# Patient Record
Sex: MALE | Race: WHITE | NOT HISPANIC OR LATINO | Employment: FULL TIME | URBAN - METROPOLITAN AREA
[De-identification: names, ages, dates, MRNs, and addresses within clinical notes are randomized per-mention and may not be internally consistent; named-entity substitution may affect disease eponyms.]

---

## 2021-08-26 ENCOUNTER — HOSPITAL ENCOUNTER (EMERGENCY)
Facility: HOSPITAL | Age: 56
Discharge: HOME | End: 2021-08-27
Attending: EMERGENCY MEDICINE
Payer: COMMERCIAL

## 2021-08-26 VITALS
HEART RATE: 54 BPM | HEIGHT: 64 IN | SYSTOLIC BLOOD PRESSURE: 179 MMHG | WEIGHT: 155 LBS | OXYGEN SATURATION: 99 % | TEMPERATURE: 97.7 F | DIASTOLIC BLOOD PRESSURE: 106 MMHG | BODY MASS INDEX: 26.46 KG/M2 | RESPIRATION RATE: 20 BRPM

## 2021-08-26 DIAGNOSIS — R79.1 SUPRATHERAPEUTIC INR: ICD-10-CM

## 2021-08-26 DIAGNOSIS — S01.81XA FACIAL LACERATION, INITIAL ENCOUNTER: ICD-10-CM

## 2021-08-26 DIAGNOSIS — S09.93XA FACIAL INJURY, INITIAL ENCOUNTER: Primary | ICD-10-CM

## 2021-08-26 PROCEDURE — 99284 EMERGENCY DEPT VISIT MOD MDM: CPT

## 2021-08-26 PROCEDURE — 99284 EMERGENCY DEPT VISIT MOD MDM: CPT | Performed by: EMERGENCY MEDICINE

## 2021-08-26 PROCEDURE — 90471 IMMUNIZATION ADMIN: CPT

## 2021-08-26 RX ORDER — TRANEXAMIC ACID 100 MG/ML
500 INJECTION, SOLUTION INTRAVENOUS ONCE
Status: COMPLETED | OUTPATIENT
Start: 2021-08-27 | End: 2021-08-27

## 2021-08-27 ENCOUNTER — APPOINTMENT (EMERGENCY)
Dept: CT IMAGING | Facility: HOSPITAL | Age: 56
End: 2021-08-27
Payer: COMMERCIAL

## 2021-08-27 LAB
BASOPHILS # BLD MANUAL: 0.08 THOUSAND/UL (ref 0–0.1)
BASOPHILS NFR MAR MANUAL: 1 % (ref 0–1)
EOSINOPHIL # BLD MANUAL: 0.08 THOUSAND/UL (ref 0–0.4)
EOSINOPHIL NFR BLD MANUAL: 1 % (ref 0–6)
ERYTHROCYTE [DISTWIDTH] IN BLOOD BY AUTOMATED COUNT: 13.8 % (ref 11.6–15.1)
HCT VFR BLD AUTO: 41.2 % (ref 36.5–49.3)
HGB BLD-MCNC: 13.4 G/DL (ref 12–17)
INR PPP: 5.39 (ref 0.84–1.19)
LYMPHOCYTES # BLD AUTO: 2.62 THOUSAND/UL (ref 0.6–4.47)
LYMPHOCYTES # BLD AUTO: 34 % (ref 14–44)
MCH RBC QN AUTO: 29.7 PG (ref 26.8–34.3)
MCHC RBC AUTO-ENTMCNC: 32.5 G/DL (ref 31.4–37.4)
MCV RBC AUTO: 91 FL (ref 82–98)
MONOCYTES # BLD AUTO: 0.77 THOUSAND/UL (ref 0–1.22)
MONOCYTES NFR BLD: 10 % (ref 4–12)
NEUTROPHILS # BLD MANUAL: 3.78 THOUSAND/UL (ref 1.85–7.62)
NEUTS BAND NFR BLD MANUAL: 1 % (ref 0–8)
NEUTS SEG NFR BLD AUTO: 48 % (ref 43–75)
PLATELET # BLD AUTO: 335 THOUSANDS/UL (ref 149–390)
PLATELET BLD QL SMEAR: ADEQUATE
PMV BLD AUTO: 10 FL (ref 8.9–12.7)
PROTHROMBIN TIME: 49.5 SECONDS (ref 11.6–14.5)
RBC # BLD AUTO: 4.51 MILLION/UL (ref 3.88–5.62)
RBC MORPH BLD: NORMAL
VARIANT LYMPHS # BLD AUTO: 5 %
WBC # BLD AUTO: 7.71 THOUSAND/UL (ref 4.31–10.16)

## 2021-08-27 PROCEDURE — 85027 COMPLETE CBC AUTOMATED: CPT | Performed by: EMERGENCY MEDICINE

## 2021-08-27 PROCEDURE — 36415 COLL VENOUS BLD VENIPUNCTURE: CPT | Performed by: EMERGENCY MEDICINE

## 2021-08-27 PROCEDURE — 70450 CT HEAD/BRAIN W/O DYE: CPT

## 2021-08-27 PROCEDURE — 90715 TDAP VACCINE 7 YRS/> IM: CPT | Performed by: EMERGENCY MEDICINE

## 2021-08-27 PROCEDURE — 70486 CT MAXILLOFACIAL W/O DYE: CPT

## 2021-08-27 PROCEDURE — 85007 BL SMEAR W/DIFF WBC COUNT: CPT | Performed by: EMERGENCY MEDICINE

## 2021-08-27 PROCEDURE — 85610 PROTHROMBIN TIME: CPT | Performed by: EMERGENCY MEDICINE

## 2021-08-27 PROCEDURE — 96365 THER/PROPH/DIAG IV INF INIT: CPT

## 2021-08-27 RX ADMIN — PHYTONADIONE 5 MG: 10 INJECTION, EMULSION INTRAMUSCULAR; INTRAVENOUS; SUBCUTANEOUS at 01:55

## 2021-08-27 RX ADMIN — TETANUS TOXOID, REDUCED DIPHTHERIA TOXOID AND ACELLULAR PERTUSSIS VACCINE, ADSORBED 0.5 ML: 5; 2.5; 8; 8; 2.5 SUSPENSION INTRAMUSCULAR at 00:38

## 2021-08-27 RX ADMIN — TRANEXAMIC ACID 500 MG: 1 INJECTION, SOLUTION INTRAVENOUS at 00:20

## 2021-08-27 NOTE — ED PROVIDER NOTES
History  Chief Complaint   Patient presents with    Facial Injury     Pt presents to ED with facial injury  Pt reports injury occured last night and was seen at urgent care today  Pt reports a 2x4 hit him on his right face and pt is on coumadin  Pt reports INR is elevated on home meter and read 6      49-year-old male presents emergency department with facial injury  Yesterday he got hit in the face with a 2 x 4  This was yesterday evening  Patient thought that it would improve at home however he is on Coumadin and is home INR machine read that his INR is 8  He has not been able to get the bleeding under control  He went to urgent care they were unable to get the bleeding under control so they sent to the emergency department  Patient denies headache, no loss of consciousness, no other injuries from the event  Unknown last tetanus shot  Patient is bleeding through bandages placed at urgent care  Topical TXA and surgical foam is used and apparently sufficient to control bleeding  None       Past Medical History:   Diagnosis Date    Hypertension        Past Surgical History:   Procedure Laterality Date    VALVE REPLACEMENT         History reviewed  No pertinent family history  I have reviewed and agree with the history as documented  E-Cigarette/Vaping     E-Cigarette/Vaping Substances     Social History     Tobacco Use    Smoking status: Never Smoker    Smokeless tobacco: Never Used   Substance Use Topics    Alcohol use: Not on file    Drug use: Not on file       Review of Systems   Constitutional: Negative for chills, fatigue and fever  HENT: Negative for congestion, rhinorrhea and sore throat  Respiratory: Negative for shortness of breath  Cardiovascular: Negative for chest pain  Gastrointestinal: Negative for abdominal pain, nausea and vomiting  Musculoskeletal: Negative for back pain, neck pain and neck stiffness  Skin: Positive for wound (Right facial)  Neurological: Negative for dizziness, weakness, light-headedness, numbness and headaches  Hematological: Bruises/bleeds easily  All other systems reviewed and are negative  Physical Exam  Physical Exam  Vitals reviewed  Constitutional:       General: He is not in acute distress  Appearance: He is well-developed  He is not ill-appearing, toxic-appearing or diaphoretic  HENT:      Head: Normocephalic  Right Ear: Tympanic membrane normal       Left Ear: Tympanic membrane normal       Nose:      Comments: Wound to left nose, bleeding is controlled  Eyes:      Conjunctiva/sclera: Conjunctivae normal    Cardiovascular:      Rate and Rhythm: Regular rhythm  Bradycardia present  Pulmonary:      Effort: Pulmonary effort is normal  No respiratory distress  Breath sounds: Normal breath sounds  Musculoskeletal:         General: Normal range of motion  Cervical back: Normal range of motion and neck supple  No rigidity or tenderness  Lymphadenopathy:      Cervical: No cervical adenopathy  Skin:     General: Skin is warm and dry  Coloration: Skin is not pale  Comments: Bleeding wound to right face, difficult to control because of low clotting factor  No embedded FB  No suture repair needed   Neurological:      Mental Status: He is alert and oriented to person, place, and time  Cranial Nerves: No cranial nerve deficit     Psychiatric:         Behavior: Behavior normal          Vital Signs  ED Triage Vitals [08/26/21 2250]   Temperature Pulse Respirations Blood Pressure SpO2   97 7 °F (36 5 °C) (!) 54 20 (!) 179/106 99 %      Temp Source Heart Rate Source Patient Position - Orthostatic VS BP Location FiO2 (%)   Temporal Monitor Sitting Left arm --      Pain Score       2           Vitals:    08/26/21 2250   BP: (!) 179/106   Pulse: (!) 54   Patient Position - Orthostatic VS: Sitting         Visual Acuity      ED Medications  Medications   tranexamic acid 100mg/mL (for epistaxis) 500 mg (500 mg Nasal Given 8/27/21 0020)   tetanus-diphtheria-acellular pertussis (BOOSTRIX) IM injection 0 5 mL (0 5 mL Intramuscular Given 8/27/21 0038)   phytonadione (AQUA-MEPHYTON) 10 mg/mL 5 mg in sodium chloride 0 9 % 50 mL IVPB (0 mg Intravenous Stopped 8/27/21 0300)       Diagnostic Studies  Results Reviewed     Procedure Component Value Units Date/Time    Protime-INR [[id number]]  (Abnormal) Collected: 08/27/21 0021    Lab Status: Final result Specimen: Blood from Arm, Right Updated: 08/27/21 0106     Protime 49 5 seconds      INR 5 39    CBC and differential [[id number]]  (Normal) Collected: 08/27/21 0021    Lab Status: Final result Specimen: Blood from Arm, Right Updated: 08/27/21 0057     WBC 7 71 Thousand/uL      RBC 4 51 Million/uL      Hemoglobin 13 4 g/dL      Hematocrit 41 2 %      MCV 91 fL      MCH 29 7 pg      MCHC 32 5 g/dL      RDW 13 8 %      MPV 10 0 fL      Platelets 423 Thousands/uL     Manual Differential(PHLEBS Do Not Order) [[id number]]  (Abnormal) Collected: 08/27/21 0021    Lab Status: Final result Specimen: Blood from Arm, Right Updated: 08/27/21 0057     Segmented % 48 %      Bands % 1 %      Lymphocytes % 34 %      Monocytes % 10 %      Eosinophils, % 1 %      Basophils % 1 %      Atypical Lymphocytes % 5 %      Absolute Neutrophils 3 78 Thousand/uL      Lymphocytes Absolute 2 62 Thousand/uL      Monocytes Absolute 0 77 Thousand/uL      Eosinophils Absolute 0 08 Thousand/uL      Basophils Absolute 0 08 Thousand/uL      Total Counted --     RBC Morphology Normal     Platelet Estimate Adequate                 CT head without contrast   Final Result by Doris Cuellar MD (08/27 0149)      No intracranial hemorrhage or calvarial fracture  Workstation performed: [id number]         CT facial bones without contrast   Final Result by Doris Cuellar MD (08/27 2434)      No acute maxillofacial fracture                 Workstation performed: [id number] Procedures  Procedures         ED Course  ED Course as of Aug 28 0731   Fri Aug 27, 2021   0104 No blood loss anemia   Hemoglobin: 13 4   0120 Patient agreeable to plan, will give IV vitamin K, reassess facial wound discharge home  SBIRT 20yo+      Most Recent Value   SBIRT (22 yo +)   In order to provide better care to our patients, we are screening all of our patients for alcohol and drug use  Would it be okay to ask you these screening questions? Yes Filed at: 08/27/2021 0156   Initial Alcohol Screen: US AUDIT-C    1  How often do you have a drink containing alcohol?  0 Filed at: 08/27/2021 0156   2  How many drinks containing alcohol do you have on a typical day you are drinking? 0 Filed at: 08/27/2021 0156   3a  Male UNDER 65: How often do you have five or more drinks on one occasion? 0 Filed at: 08/27/2021 0156   Audit-C Score  0 Filed at: 08/27/2021 0715   OZZIE: How many times in the past year have you    Used an illegal drug or used a prescription medication for non-medical reasons? Never Filed at: 08/27/2021 0156                    MDM  Number of Diagnoses or Management Options  Facial injury, initial encounter  Facial laceration, initial encounter  Supratherapeutic INR  Diagnosis management comments: Facial injury, bleeding to right face that is difficult to control  CT of head and face to rule out intracranial hemorrhage, rule out facial bone fracture  Imaging is negative for intracranial injury or facial bone fracture  Patient's INR is supratherapeutic 5 39  He is given IV vitamin K, Surgicel foam used to control bleeding, patient discharged in stable condition and is advised recheck INR in the morning and to discuss with primary care provider Coumadin dosing going forward  Advised return for rebleeding for signs of intracranial injury  Discharged in stable condition         Amount and/or Complexity of Data Reviewed  Clinical lab tests: ordered and reviewed  Tests in the radiology section of CPT®: ordered and reviewed        Disposition  Final diagnoses:   Facial injury, initial encounter   Facial laceration, initial encounter   Supratherapeutic INR     Time reflects when diagnosis was documented in both MDM as applicable and the Disposition within this note     Time User Action Codes Description Comment    8/27/2021 12:49 AM Aashish Viera Add [S09 93XA] Facial injury, initial encounter     8/27/2021  1:46 AM Tanya Viera Add [S01 81XA] Facial laceration, initial encounter     8/27/2021  1:46 AM Aashish Viera Add [R79 1] Supratherapeutic INR       ED Disposition     ED Disposition Condition Date/Time Comment    Discharge Stable Fri Aug 27, 2021  1:46 AM Aliyah Villavicencio discharge to home/self care  Follow-up Information     Follow up With Specialties Details Why Contact Info Additional Information    Baldev Cruz MD Sleep Medicine, Family Medicine Schedule an appointment as soon as possible for a visit  For follow up to ensure improvement, and for further testing and treatment as needed [address] [place] [address] Emergency Department Emergency Medicine  If symptoms worsen: rebleeding, signs of worsening head injury, signs of infection, worsening INR, etc [address] [facility] [id number] [phone] [facility] Emergency Department, [address] [place] [address]          There are no discharge medications for this patient  No discharge procedures on file      PDMP Review     None          ED Provider  Electronically Signed by           hSelia Reyes DO  08/28/21 6920

## 2024-09-24 ENCOUNTER — TELEPHONE (OUTPATIENT)
Age: 59
End: 2024-09-24

## 2025-01-10 ENCOUNTER — PREP FOR PROCEDURE (OUTPATIENT)
Age: 60
End: 2025-01-10

## 2025-01-17 ENCOUNTER — OFFICE VISIT (OUTPATIENT)
Age: 60
End: 2025-01-17
Payer: COMMERCIAL

## 2025-01-17 VITALS
HEART RATE: 52 BPM | SYSTOLIC BLOOD PRESSURE: 127 MMHG | OXYGEN SATURATION: 98 % | DIASTOLIC BLOOD PRESSURE: 78 MMHG | WEIGHT: 165 LBS | BODY MASS INDEX: 28.32 KG/M2

## 2025-01-17 DIAGNOSIS — Z12.11 SCREENING FOR COLON CANCER: Primary | ICD-10-CM

## 2025-01-17 DIAGNOSIS — Z95.2 HISTORY OF AORTIC VALVE REPLACEMENT: ICD-10-CM

## 2025-01-17 PROCEDURE — 99203 OFFICE O/P NEW LOW 30 MIN: CPT | Performed by: COLON & RECTAL SURGERY

## 2025-01-17 RX ORDER — ATENOLOL 50 MG/1
1 TABLET ORAL DAILY
COMMUNITY
Start: 2024-12-05

## 2025-01-17 RX ORDER — ASPIRIN 81 MG/1
81 TABLET, CHEWABLE ORAL DAILY
COMMUNITY

## 2025-01-17 RX ORDER — SODIUM CHLORIDE, SODIUM LACTATE, POTASSIUM CHLORIDE, CALCIUM CHLORIDE 600; 310; 30; 20 MG/100ML; MG/100ML; MG/100ML; MG/100ML
125 INJECTION, SOLUTION INTRAVENOUS CONTINUOUS
OUTPATIENT
Start: 2025-01-17

## 2025-01-17 RX ORDER — WARFARIN SODIUM 2.5 MG/1
1 TABLET ORAL DAILY
COMMUNITY
Start: 2024-10-12

## 2025-01-17 NOTE — PATIENT INSTRUCTIONS
Scheduled date of colonoscopy (as of today): 3/15/25  Physician performing colonoscopy: Wilda  Location of colonoscopy: [place]  Bowel prep reviewed with patient: Golytely  Instructions reviewed with patient by: Sindhu GUSMAN  Clearances: Coumadin- 1 day hold

## 2025-01-17 NOTE — PROGRESS NOTES
Name: Paulo Mackenzie      : 1965      MRN: [id number]  Encounter Provider: Pancho Astudillo MD  Encounter Date: 2025   Encounter department: [facility] COLON AND RECTAL SURGERY [place]  :  Assessment & Plan  Screening for colon cancer    Orders:    Colonoscopy; Future    History of aortic valve replacement  Patient will stop his Coumadin 1 day prior to colonoscopy.    Patient will stop his aspirin 5 days prior to colonoscopy           History of Present Illness   HPI  Paulo Mackenzie is a 59 y.o. male who presents age indicated screening colonoscopy last colonoscopy   History obtained from: patient    Review of Systems   Constitutional:  Negative for chills and fever.   HENT:  Negative for ear pain and sore throat.    Eyes:  Negative for pain and visual disturbance.   Respiratory:  Negative for cough and shortness of breath.    Cardiovascular:  Negative for chest pain and palpitations.   Gastrointestinal:  Negative for abdominal pain and vomiting.   Genitourinary:  Negative for dysuria and hematuria.   Musculoskeletal:  Negative for arthralgias and back pain.   Skin:  Negative for color change and rash.   Neurological:  Negative for seizures and syncope.   All other systems reviewed and are negative.    Past Medical History   Past Medical History:   Diagnosis Date    Hyperlipidemia     Hypertension      Past Surgical History:   Procedure Laterality Date    VALVE REPLACEMENT       History reviewed. No pertinent family history.   reports that he has never smoked. He has never used smokeless tobacco.  Current Outpatient Medications on File Prior to Visit   Medication Sig Dispense Refill    aspirin 81 mg chewable tablet Chew 81 mg daily      atenolol (TENORMIN) 50 mg tablet Take 1 tablet by mouth in the morning      Rosuvastatin Calcium POWD Take by mouth      warfarin (COUMADIN) 2.5 mg tablet Take 1 tablet by mouth in the morning       No current facility-administered medications on file prior  to visit.   No Known Allergies      Objective   /78   Pulse (!) 52   Wt 74.8 kg (165 lb)   SpO2 98%   BMI 28.32 kg/m²      Physical Exam  Vitals and nursing note reviewed.   Constitutional:       General: He is not in acute distress.     Appearance: He is well-developed.   HENT:      Head: Normocephalic and atraumatic.   Eyes:      Conjunctiva/sclera: Conjunctivae normal.   Cardiovascular:      Rate and Rhythm: Normal rate and regular rhythm.      Heart sounds: No murmur heard.  Pulmonary:      Effort: Pulmonary effort is normal. No respiratory distress.      Breath sounds: Normal breath sounds.   Abdominal:      Palpations: Abdomen is soft.      Tenderness: There is no abdominal tenderness.   Musculoskeletal:         General: No swelling.      Cervical back: Neck supple.   Skin:     General: Skin is warm and dry.      Capillary Refill: Capillary refill takes less than 2 seconds.   Neurological:      Mental Status: He is alert.   Psychiatric:         Mood and Affect: Mood normal.

## 2025-03-15 ENCOUNTER — ANESTHESIA (OUTPATIENT)
Dept: GASTROENTEROLOGY | Facility: HOSPITAL | Age: 60
End: 2025-03-15
Payer: COMMERCIAL

## 2025-03-15 ENCOUNTER — HOSPITAL ENCOUNTER (OUTPATIENT)
Dept: GASTROENTEROLOGY | Facility: HOSPITAL | Age: 60
Setting detail: OUTPATIENT SURGERY
Discharge: HOME | End: 2025-03-15
Attending: COLON & RECTAL SURGERY
Payer: COMMERCIAL

## 2025-03-15 ENCOUNTER — ANESTHESIA EVENT (OUTPATIENT)
Dept: GASTROENTEROLOGY | Facility: HOSPITAL | Age: 60
End: 2025-03-15
Payer: COMMERCIAL

## 2025-03-15 VITALS
TEMPERATURE: 97.9 F | DIASTOLIC BLOOD PRESSURE: 64 MMHG | WEIGHT: 160.5 LBS | HEIGHT: 64 IN | BODY MASS INDEX: 27.4 KG/M2 | OXYGEN SATURATION: 97 % | HEART RATE: 50 BPM | RESPIRATION RATE: 16 BRPM | SYSTOLIC BLOOD PRESSURE: 119 MMHG

## 2025-03-15 DIAGNOSIS — Z12.11 SCREENING FOR COLON CANCER: ICD-10-CM

## 2025-03-15 PROBLEM — E78.2 MIXED HYPERLIPIDEMIA: Status: ACTIVE | Noted: 2018-10-18

## 2025-03-15 PROBLEM — Z95.2 AORTIC VALVE REPLACED: Status: ACTIVE | Noted: 2017-11-14

## 2025-03-15 PROBLEM — I35.2 NONRHEUMATIC AORTIC VALVE STENOSIS WITH INSUFFICIENCY: Status: ACTIVE | Noted: 2021-08-10

## 2025-03-15 PROBLEM — I25.10 CORONARY ARTERY DISEASE INVOLVING NATIVE CORONARY ARTERY OF NATIVE HEART WITHOUT ANGINA PECTORIS: Status: ACTIVE | Noted: 2018-10-18

## 2025-03-15 PROBLEM — I10 HTN (HYPERTENSION): Status: ACTIVE | Noted: 2025-03-15

## 2025-03-15 PROCEDURE — 88305 TISSUE EXAM BY PATHOLOGIST: CPT | Performed by: PATHOLOGY

## 2025-03-15 PROCEDURE — 45385 COLONOSCOPY W/LESION REMOVAL: CPT | Performed by: COLON & RECTAL SURGERY

## 2025-03-15 RX ORDER — SODIUM CHLORIDE, SODIUM LACTATE, POTASSIUM CHLORIDE, CALCIUM CHLORIDE 600; 310; 30; 20 MG/100ML; MG/100ML; MG/100ML; MG/100ML
INJECTION, SOLUTION INTRAVENOUS CONTINUOUS PRN
Status: DISCONTINUED | OUTPATIENT
Start: 2025-03-15 | End: 2025-03-15

## 2025-03-15 RX ORDER — SODIUM CHLORIDE, SODIUM LACTATE, POTASSIUM CHLORIDE, CALCIUM CHLORIDE 600; 310; 30; 20 MG/100ML; MG/100ML; MG/100ML; MG/100ML
125 INJECTION, SOLUTION INTRAVENOUS CONTINUOUS
Status: DISCONTINUED | OUTPATIENT
Start: 2025-03-15 | End: 2025-03-19 | Stop reason: HOSPADM

## 2025-03-15 RX ORDER — LIDOCAINE HYDROCHLORIDE 10 MG/ML
INJECTION, SOLUTION EPIDURAL; INFILTRATION; INTRACAUDAL; PERINEURAL AS NEEDED
Status: DISCONTINUED | OUTPATIENT
Start: 2025-03-15 | End: 2025-03-15

## 2025-03-15 RX ORDER — PROPOFOL 10 MG/ML
INJECTION, EMULSION INTRAVENOUS AS NEEDED
Status: DISCONTINUED | OUTPATIENT
Start: 2025-03-15 | End: 2025-03-15

## 2025-03-15 RX ADMIN — PROPOFOL 80 MG: 10 INJECTION, EMULSION INTRAVENOUS at 07:48

## 2025-03-15 RX ADMIN — PROPOFOL 20 MG: 10 INJECTION, EMULSION INTRAVENOUS at 07:58

## 2025-03-15 RX ADMIN — PROPOFOL 20 MG: 10 INJECTION, EMULSION INTRAVENOUS at 07:50

## 2025-03-15 RX ADMIN — SODIUM CHLORIDE, SODIUM LACTATE, POTASSIUM CHLORIDE, AND CALCIUM CHLORIDE: .6; .31; .03; .02 INJECTION, SOLUTION INTRAVENOUS at 07:43

## 2025-03-15 RX ADMIN — PROPOFOL 20 MG: 10 INJECTION, EMULSION INTRAVENOUS at 07:53

## 2025-03-15 RX ADMIN — SODIUM CHLORIDE, SODIUM LACTATE, POTASSIUM CHLORIDE, AND CALCIUM CHLORIDE 125 ML/HR: .6; .31; .03; .02 INJECTION, SOLUTION INTRAVENOUS at 07:17

## 2025-03-15 RX ADMIN — PROPOFOL 20 MG: 10 INJECTION, EMULSION INTRAVENOUS at 07:55

## 2025-03-15 RX ADMIN — LIDOCAINE HYDROCHLORIDE 50 MG: 10 INJECTION, SOLUTION EPIDURAL; INFILTRATION; INTRACAUDAL; PERINEURAL at 07:48

## 2025-03-15 NOTE — ANESTHESIA PREPROCEDURE EVALUATION
Procedure:  COLONOSCOPY    Relevant Problems   ANESTHESIA   (-) History of anesthesia complications      CARDIO   (+) Coronary artery disease involving native coronary artery of native heart without angina pectoris   (+) HTN (hypertension)   (+) Mixed hyperlipidemia   (+) Nonrheumatic aortic valve stenosis with insufficiency   (-) Chest pain   (-) VILLARREAL (dyspnea on exertion)      PULMONARY   (-) Shortness of breath   (-) Sleep apnea   (-) URI (upper respiratory infection)      Surgery/Wound/Pain   (+) Aortic valve replaced        Left Ventricle: Systolic function is normal with an ejection fraction   of 60-65%.     Aortic Valve: There is a mechanical aortic valve. The prosthetic valve   appears to be functioning normally.     Pulmonic Valve: There is mild to moderate regurgitation.   Physical Exam    Airway    Mallampati score: II  TM Distance: >3 FB  Neck ROM: full     Dental       Cardiovascular      Pulmonary      Other Findings        Anesthesia Plan  ASA Score- 2     Anesthesia Type- IV sedation with anesthesia with ASA Monitors.         Additional Monitors:     Airway Plan:            Plan Factors-Exercise tolerance (METS): >4 METS.    Chart reviewed. EKG reviewed.  Existing labs reviewed. Patient summary reviewed.                  Induction- intravenous.    Postoperative Plan-         Informed Consent- Anesthetic plan and risks discussed with patient.  I personally reviewed this patient with the CRNA. Discussed and agreed on the Anesthesia Plan with the CRNA..      NPO Status:  Vitals Value Taken Time   Date of last liquid 03/15/25 03/15/25 0704   Time of last liquid 0630 03/15/25 0704   Date of last solid 03/13/25 03/15/25 0704   Time of last solid 1800 03/15/25 0704

## 2025-03-15 NOTE — ANESTHESIA POSTPROCEDURE EVALUATION
Post-Op Assessment Note    CV Status:  Stable    Pain management: adequate       Mental Status:  Sleepy   Hydration Status:  Euvolemic   PONV Controlled:  Controlled   Airway Patency:  Patent     Post Op Vitals Reviewed: Yes    No anethesia notable event occurred.    Staff: Anesthesiologist, CRNA           Last Filed PACU Vitals:  Vitals Value Taken Time   Temp 97.9 °F (36.6 °C) 03/15/25 0805   Pulse 55 03/15/25 0805   BP 99/57 03/15/25 0805   Resp 16 03/15/25 0805   SpO2 93 % 03/15/25 0805       Modified Alec:     Vitals Value Taken Time   Activity 2 03/15/25 0805   Respiration 2 03/15/25 0805   Circulation 2 03/15/25 0805   Consciousness 2 03/15/25 0805   Oxygen Saturation 2 03/15/25 0805     Modified Alec Score: 10

## 2025-03-15 NOTE — H&P
History and Physical - [facility]  Paulo Mackenzie 59 y.o. male MRN: [id number]                  HPI: Paulo Mackenzie is a 59 y.o. year old male who presents for age indicated screening colonoscopy.  Patient's last colonoscopic evaluation was 2014      REVIEW OF SYSTEMS: Per the HPI, and otherwise unremarkable.    Historical Information   Past Medical History:   Diagnosis Date    Hyperlipidemia     Hypertension      Past Surgical History:   Procedure Laterality Date    VALVE REPLACEMENT       Social History   Social History     Substance and Sexual Activity   Alcohol Use None     Social History     Substance and Sexual Activity   Drug Use Not on file     Social History     Tobacco Use   Smoking Status Never   Smokeless Tobacco Never     History reviewed. No pertinent family history.    Meds/Allergies     Not in a hospital admission.    No Known Allergies    Objective     There were no vitals taken for this visit.      PHYSICAL EXAM    Gen: NAD  CV: RRR  CHEST: Clear  ABD: soft, NT/ND  EXT: no edema  Neuro: AAO      ASSESSMENT/PLAN:  This is a 59 y.o. year old male here for age indicated screening: Neoplasia    PLAN:   Procedure: Screening colonoscopy

## 2025-03-19 PROCEDURE — 88305 TISSUE EXAM BY PATHOLOGIST: CPT | Performed by: PATHOLOGY
